# Patient Record
Sex: MALE | Race: WHITE | Employment: UNEMPLOYED | URBAN - NONMETROPOLITAN AREA
[De-identification: names, ages, dates, MRNs, and addresses within clinical notes are randomized per-mention and may not be internally consistent; named-entity substitution may affect disease eponyms.]

---

## 2023-02-01 ENCOUNTER — HOSPITAL ENCOUNTER (EMERGENCY)
Age: 28
Discharge: HOME | End: 2023-02-01
Attending: EMERGENCY MEDICINE
Payer: MEDICAID

## 2023-02-01 VITALS
WEIGHT: 125 LBS | HEART RATE: 69 BPM | HEIGHT: 68 IN | TEMPERATURE: 98 F | OXYGEN SATURATION: 100 % | DIASTOLIC BLOOD PRESSURE: 75 MMHG | BODY MASS INDEX: 18.94 KG/M2 | RESPIRATION RATE: 18 BRPM | SYSTOLIC BLOOD PRESSURE: 122 MMHG

## 2023-02-01 DIAGNOSIS — F13.939 WITHDRAWAL FROM SEDATIVE, HYPNOTIC, OR ANXIOLYTIC DRUG (HCC): ICD-10-CM

## 2023-02-01 DIAGNOSIS — R11.2 NAUSEA AND VOMITING, UNSPECIFIED VOMITING TYPE: Primary | ICD-10-CM

## 2023-02-01 DIAGNOSIS — F11.93 OPIOID WITHDRAWAL (HCC): ICD-10-CM

## 2023-02-01 PROCEDURE — 99283 EMERGENCY DEPT VISIT LOW MDM: CPT

## 2023-02-01 PROCEDURE — 74011250636 HC RX REV CODE- 250/636: Performed by: EMERGENCY MEDICINE

## 2023-02-01 RX ORDER — ONDANSETRON 4 MG/1
4 TABLET, ORALLY DISINTEGRATING ORAL
Qty: 14 TABLET | Refills: 0 | Status: SHIPPED | OUTPATIENT
Start: 2023-02-01

## 2023-02-01 RX ORDER — ONDANSETRON 4 MG/1
4 TABLET, ORALLY DISINTEGRATING ORAL
Status: COMPLETED | OUTPATIENT
Start: 2023-02-01 | End: 2023-02-01

## 2023-02-01 RX ADMIN — ONDANSETRON 4 MG: 4 TABLET, ORALLY DISINTEGRATING ORAL at 01:35

## 2023-02-01 NOTE — ED NOTES
I have reviewed discharge instructions with the patient. The patient verbalized understanding. Patient has no other concerns. Patient encourage to return to ED with any new concerns or emergent care needs. Patient escorted to waiting area with mom. Steady gait noted. Patient in no apparent distress upon departure.

## 2023-02-01 NOTE — ED TRIAGE NOTES
Patient states he thinks he is having withdrawal symptoms from opiates and Benzo's. Patient states he used day before last Fentanyl and Xylazine, Reports he snorted drugs. Reports he feels like his heart is racing and N/V, chills, and sweats.

## 2023-02-01 NOTE — ED NOTES
Patient denies nausea and vomiting. Po challenge given of ice water and tolerated well. Patient continues to reports his heart racing. Rate remains in 50-60's. Dr. Polly Le made aware and is in talking with patient.

## 2023-02-01 NOTE — ED PROVIDER NOTES
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 H/o abuse off and on x 13 yrs. No fever. C/o diffuse body aches. + diarrhea. No weakness. No cp. No fever. No sob. Past Medical History:   Diagnosis Date    Drug abuse ([facility] [place] [address]     opiates and Benzo's       Past Surgical History:   Procedure Laterality Date    HX HEENT      jaw repair    HX TONSIL AND ADENOIDECTOMY           History reviewed. No pertinent family history. Social History     Socioeconomic History    Marital status: SINGLE     Spouse name: Not on file    Number of children: Not on file    Years of education: Not on file    Highest education level: Not on file   Occupational History    Not on file   Tobacco Use    Smoking status: Never    Smokeless tobacco: Never   Vaping Use    Vaping Use: Not on file   Substance and Sexual Activity    Alcohol use: Yes     Comment: occasionally    Drug use: Yes     Types: Opiates, Benzodiazepines    Sexual activity: Not on file   Other Topics Concern    Not on file   Social History Narrative    Not on file     Social Determinants of Health     Financial Resource Strain: Not on file   Food Insecurity: Not on file   Transportation Needs: Not on file   Physical Activity: Not on file   Stress: Not on file   Social Connections: Not on file   Intimate Partner Violence: Not on file   Housing Stability: Not on file         ALLERGIES: Patient has no known allergies. Review of Systems   Constitutional:  Positive for fatigue. Negative for diaphoresis and fever. HENT:  Negative for congestion. Respiratory:  Negative for cough and shortness of breath. Cardiovascular:  Negative for chest pain. Gastrointestinal:  Positive for nausea and vomiting. Negative for abdominal pain. Musculoskeletal:  Negative for back pain. Skin:  Negative for rash. Neurological:  Positive for light-headedness. Negative for dizziness. All other systems reviewed and are negative.     Vitals:    02/01/23 2025 02/01/23 0128 02/01/23 0200   BP: 127/78 122/81 121/61   Pulse: 61 71 (!) 49   Resp: 18 18 17   Temp: 98 °F (36.7 °C)     SpO2: 100% 96% 96%   Weight: 56.7 kg (125 lb)     Height: 5' 8\" (1.727 m)              Physical Exam  Vitals and nursing note reviewed. Constitutional:       Appearance: He is well-developed. HENT:      Head: Normocephalic and atraumatic. Eyes:      Conjunctiva/sclera: Conjunctivae normal.   Cardiovascular:      Rate and Rhythm: Normal rate and regular rhythm. Pulmonary:      Effort: Pulmonary effort is normal.      Breath sounds: No wheezing. Abdominal:      General: There is no distension. Palpations: Abdomen is soft. Tenderness: There is no guarding. Musculoskeletal:         General: No tenderness. Cervical back: Normal range of motion. Skin:     General: Skin is warm and dry. Capillary Refill: Capillary refill takes less than 2 seconds. Findings: No rash. Neurological:      Mental Status: He is alert and oriented to person, place, and time. Psychiatric:         Mood and Affect: Mood normal.        Medical Decision Making  Risk  Prescription drug management. Procedures    Vitals:  Patient Vitals for the past 12 hrs:   Temp Pulse Resp BP SpO2   02/01/23 0200 -- (!) 49 17 121/61 96 %   02/01/23 0128 -- 71 18 122/81 96 %   02/01/23 0053 98 °F (36.7 °C) 61 18 127/78 100 %         Medications ordered:   Medications   ondansetron (ZOFRAN ODT) tablet 4 mg (4 mg Oral Given 2/1/23 0135)         Lab findings:  No results found for this or any previous visit (from the past 12 hour(s)). X-Ray, CT or other radiology findings or impressions:  No orders to display             Progress notes, Consult notes or additional Procedure notes:   3:03 AM pt improved after zofran po, request dc. To dc per d/w pt. Not good candidate for clonidine, hr 48-70 in ed. Pt was worried about rapid ht rate, no tachycardia during ed monitoring. To dc per d/w pt.  Detailed ret inst given. No emc. Diagnosis:   1. Nausea and vomiting, unspecified vomiting type    2. Withdrawal from sedative, hypnotic, or anxiolytic drug ([facility] [address]    3. Opioid withdrawal ([facility] [address]        Disposition: home    Follow-up Information       Follow up With Specialties Details Why Contact Info    [facility] EMERGENCY DEPT Emergency Medicine Go to  As needed, If symptoms worsen [address]  [phone]    Eliana Hill NP Nurse Practitioner Schedule an appointment as soon as possible for a visit in 2 days  Sally Oconnor [phone]  Schedule an appointment as soon as possible for a visit in 2 days  [facility] [address]  [phone]             Patient's Medications   Start Taking    ONDANSETRON (ZOFRAN ODT) 4 MG DISINTEGRATING TABLET    Take 1 Tablet by mouth every eight (8) hours as needed for Nausea or Vomiting.    Continue Taking    No medications on file   These Medications have changed    No medications on file   Stop Taking    No medications on file

## 2023-02-01 NOTE — ED NOTES
Patient family member came to nursing station and states patient is ready to be discharged that he has a neurology appointment later this morning.